# Patient Record
Sex: MALE | Race: WHITE | Employment: FULL TIME | URBAN - NONMETROPOLITAN AREA
[De-identification: names, ages, dates, MRNs, and addresses within clinical notes are randomized per-mention and may not be internally consistent; named-entity substitution may affect disease eponyms.]

---

## 2022-06-23 ENCOUNTER — APPOINTMENT (OUTPATIENT)
Dept: GENERAL RADIOLOGY | Age: 27
End: 2022-06-23
Payer: COMMERCIAL

## 2022-06-23 ENCOUNTER — HOSPITAL ENCOUNTER (EMERGENCY)
Age: 27
Discharge: HOME | End: 2022-06-23
Payer: COMMERCIAL

## 2022-06-23 VITALS
DIASTOLIC BLOOD PRESSURE: 82 MMHG | RESPIRATION RATE: 16 BRPM | HEART RATE: 54 BPM | TEMPERATURE: 97.8 F | SYSTOLIC BLOOD PRESSURE: 138 MMHG | OXYGEN SATURATION: 98 % | HEIGHT: 72 IN | BODY MASS INDEX: 28.44 KG/M2 | WEIGHT: 210 LBS

## 2022-06-23 DIAGNOSIS — S90.31XA CONTUSION OF RIGHT FOOT, INITIAL ENCOUNTER: Primary | ICD-10-CM

## 2022-06-23 DIAGNOSIS — T14.8XXA ABRASION: ICD-10-CM

## 2022-06-23 PROCEDURE — 99283 EMERGENCY DEPT VISIT LOW MDM: CPT

## 2022-06-23 PROCEDURE — 73630 X-RAY EXAM OF FOOT: CPT

## 2022-06-23 PROCEDURE — 6370000000 HC RX 637 (ALT 250 FOR IP): Performed by: NURSE PRACTITIONER

## 2022-06-23 PROCEDURE — 73590 X-RAY EXAM OF LOWER LEG: CPT

## 2022-06-23 RX ORDER — IBUPROFEN 600 MG/1
600 TABLET ORAL ONCE
Status: COMPLETED | OUTPATIENT
Start: 2022-06-23 | End: 2022-06-23

## 2022-06-23 RX ADMIN — IBUPROFEN 600 MG: 600 TABLET, FILM COATED ORAL at 19:39

## 2022-06-23 ASSESSMENT — PAIN DESCRIPTION - PAIN TYPE: TYPE: ACUTE PAIN

## 2022-06-23 ASSESSMENT — PAIN SCALES - GENERAL
PAINLEVEL_OUTOF10: 8
PAINLEVEL_OUTOF10: 7

## 2022-06-23 ASSESSMENT — PAIN DESCRIPTION - ORIENTATION
ORIENTATION: RIGHT
ORIENTATION: RIGHT

## 2022-06-23 ASSESSMENT — PAIN - FUNCTIONAL ASSESSMENT
PAIN_FUNCTIONAL_ASSESSMENT: 0-10
PAIN_FUNCTIONAL_ASSESSMENT: PREVENTS OR INTERFERES SOME ACTIVE ACTIVITIES AND ADLS

## 2022-06-23 ASSESSMENT — PAIN DESCRIPTION - ONSET: ONSET: SUDDEN

## 2022-06-23 ASSESSMENT — PAIN DESCRIPTION - LOCATION
LOCATION: ANKLE
LOCATION: FOOT

## 2022-06-23 ASSESSMENT — PAIN DESCRIPTION - DESCRIPTORS: DESCRIPTORS: ACHING

## 2022-06-23 NOTE — ED PROVIDER NOTES
[facility] ED  EMERGENCY DEPARTMENT ENCOUNTER      Pt Name: José Miguel Horton  MRN: [id number]  [place] 1995  Date of evaluation: 6/23/2022  Provider: RJ Hood CNP    CHIEF COMPLAINT       Chief Complaint   Patient presents with    Foot Pain     Right foot pain from falling off of pool ladder, small abrasion to right shin         HISTORY OF PRESENT ILLNESS   (Location/Symptom, Timing/Onset, Context/Setting, Quality, Duration, Modifying Factors, Severity)  Note limiting factors. José Miguel Horton is a 32 y.o. male who presents to the emergency department with complaints of slipping and falling while trying to climb out of a pool on a ladder this afternoon. Patient endorses right lateral foot pain and abrasion to his right mid lower leg. He states that initially the most pain was in his lower leg but since the injury he has had increased pain to the right foot. No open wounds at that location. He denies striking his head, headache or neck pain. Reports no paresthesias. He denies fevers, bodyaches or chills. Reports no sore throat or difficulty swallowing. No chest pain, cough or difficulty breathing. Reports no abdominal pain, vomiting, diarrhea or dysuria. Patient is up-to-date on tetanus having had 1 this year. HPI    Nursing Notes were reviewed. REVIEW OF SYSTEMS    (2-9 systems for level 4, 10 or more for level 5)     Review of Systems    Except as noted above the remainder of the review of systems was reviewed and negative. PAST MEDICAL HISTORY     Past Medical History:   Diagnosis Date    Gastroesophageal reflux disease without esophagitis          SURGICAL HISTORY       Past Surgical History:   Procedure Laterality Date    APPENDECTOMY      TONSILLECTOMY      TYMPANOSTOMY TUBE PLACEMENT           CURRENT MEDICATIONS       Previous Medications    No medications on file       ALLERGIES     Patient has no known allergies.     FAMILY HISTORY       Family History Problem Relation Age of Onset    High Cholesterol Father     Asthma Father     Colon Cancer Maternal Grandfather         48-64's    Lung Cancer Paternal Grandmother     Diabetes Paternal Grandfather     Colon Cancer Maternal Uncle         53-62          SOCIAL HISTORY       Social History     Socioeconomic History    Marital status:      Spouse name: None    Number of children: None    Years of education: None    Highest education level: None   Occupational History    None   Tobacco Use    Smoking status: Former Smoker     Years: 1.00     Start date:      Quit date:      Years since quittin.4    Smokeless tobacco: Never Used   Vaping Use    Vaping Use: Never used   Substance and Sexual Activity    Alcohol use: Yes     Comment: occasional    Drug use: No    Sexual activity: Yes     Partners: Female   Other Topics Concern    None   Social History Narrative    ** Merged History Encounter **          Social Determinants of Health     Financial Resource Strain: Low Risk     Difficulty of Paying Living Expenses: Not hard at all   Food Insecurity: No Food Insecurity    Worried About Running Out of Food in the Last Year: Never true    Melissa of Food in the Last Year: Never true   Transportation Needs:     Lack of Transportation (Medical): Not on file    Lack of Transportation (Non-Medical):  Not on file   Physical Activity:     Days of Exercise per Week: Not on file    Minutes of Exercise per Session: Not on file   Stress:     Feeling of Stress : Not on file   Social Connections:     Frequency of Communication with Friends and Family: Not on file    Frequency of Social Gatherings with Friends and Family: Not on file    Attends Protestant Services: Not on file    Active Member of Clubs or Organizations: Not on file    Attends Club or Organization Meetings: Not on file    Marital Status: Not on file   Intimate Partner Violence:     Fear of Current or Ex-Partner: Not on file  Emotionally Abused: Not on file    Physically Abused: Not on file    Sexually Abused: Not on file   Housing Stability:     Unable to Pay for Housing in the Last Year: Not on file    Number of Places Lived in the Last Year: Not on file    Unstable Housing in the Last Year: Not on file       SCREENINGS         Gavin Coma Scale  Eye Opening: Spontaneous  Best Verbal Response: Oriented  Best Motor Response: Obeys commands  Gavin Coma Scale Score: 15                     CIWA Assessment  BP: 138/82  Heart Rate: 54                 PHYSICAL EXAM    (up to 7 for level 4, 8 or more for level 5)     ED Triage Vitals [06/23/22 1836]   BP Temp Temp Source Heart Rate Resp SpO2 Height Weight   138/82 97.8 °F (36.6 °C) Tympanic 54 16 98 % 6' (1.829 m) 210 lb (95.3 kg)       Physical Exam  General: Well-developed, well-nourished, in no apparent distress. HEENT exam: Normocephalic, atraumatic. Pupils equal round and reactive to light and external ocular muscles intact. Posterior pharynx noninjected. Oral airway widely patent. No exudate present. Neck exam: Supple no lymphadenopathy, trachea midline. Chest exam: No audible wheezing. No increased respiratory effort. Heart: Normal heart rate and rhythm. No murmur. Abdomen: Soft, nontender, nondistended. Back: No midline tenderness. No CVA tenderness. Extremities: Patient moving all extremities or difficulty. Intact distal pulses and sensation. Patient abrasion without active bleeding to the right mid tibia. He has mild tenderness at that location. He has tenderness with palpation of the right lateral foot without ecchymosis or open wounds at that location. Neurologic: Alert and oriented x3. Able to make informed decisions. Skin exam: Clean dry and intact.   DIAGNOSTIC RESULTS     EKG: All EKG's are interpreted by the Emergency Department Physician who either signs or Co-signs this chart in the absence of a cardiologist.        RADIOLOGY:   Nery Delong film images such as CT, Ultrasound and MRI are read by the radiologist. Plain radiographic images are visualized and preliminarily interpreted by the emergency physician with the below findings:        Interpretation per the Radiologist below, if available at the time of this note:    XR TIBIA FIBULA RIGHT (2 VIEWS)   Final Result   No fracture or dislocation. XR FOOT RIGHT (MIN 3 VIEWS)   Final Result   No fracture or dislocation. ED BEDSIDE ULTRASOUND:   Performed by ED Physician - none    LABS:  Labs Reviewed - No data to display    All other labs were within normal range or not returned as of this dictation. EMERGENCY DEPARTMENT COURSE and DIFFERENTIAL DIAGNOSIS/MDM:   Vitals:    Vitals:    06/23/22 1836   BP: 138/82   Pulse: 54   Resp: 16   Temp: 97.8 °F (36.6 °C)   TempSrc: Tympanic   SpO2: 98%   Weight: 210 lb (95.3 kg)   Height: 6' (1.829 m)           MDM  Ellen Winter is a 32 y.o. male who presents to the emergency department with complaints of slipping and falling while trying to climb out of a pool on a ladder this afternoon. Patient endorses right lateral foot pain and abrasion to his right mid lower leg. He states that initially the most pain was in his lower leg but since the injury he has had increased pain to the right foot. No open wounds at that location. He denies striking his head, headache or neck pain. Reports no paresthesias. He denies fevers, bodyaches or chills. Reports no sore throat or difficulty swallowing. No chest pain, cough or difficulty breathing. Reports no abdominal pain, vomiting, diarrhea or dysuria. Patient is up-to-date on tetanus having had 1 this year. Exam remarkable for alert and interactive 32year-old in no acute distress. Physical exam remarkable for an abrasion to the mid right tibia and tenderness to the right lateral proximal foot. Patient has intact distal pulses and sensation. No active bleeding to the abrasion.   The remainder of his physical exam unremarkable. Plain film images of the right tibia and fibula and right foot were obtained, reviewed myself and read by radiology without acute findings. Patient given ibuprofen p.o. Right foot Ace wrapped. I recommended that the patient rest, ice intermittently and elevate the right lower extremity over the next 48 to 72 hours as directed. Wash abrasions daily with soap and water and pat dry. Take ibuprofen as directed if needed for pain. Follow-up with PCP for reevaluation the next couple of days. Return here for increased pain, fevers, difficulty breathing, vomiting or new or worsening signs or symptoms. REASSESSMENT            CONSULTS:  None    PROCEDURES:  Unless otherwise noted below, none     Procedures        FINAL IMPRESSION      1. Contusion of right foot, initial encounter New Problem   2. Abrasion New Problem         DISPOSITION/PLAN   DISPOSITION Decision To Discharge 06/23/2022 08:23:45 PM      PATIENT REFERRED TO:  RJ Bowles CNP  [address] [address]  [phone]    In 2 days  for re-evaluation      DISCHARGE MEDICATIONS:  New Prescriptions    No medications on file     Controlled Substances Monitoring:     No flowsheet data found.     (Please note that portions of this note were completed with a voice recognition program.  Efforts were made to edit the dictations but occasionally words are mis-transcribed.)    RJ Aly CNP (electronically signed)  Attending Emergency Physician            RJ Aly CNP  06/23/22 2028